# Patient Record
Sex: FEMALE | Race: OTHER | NOT HISPANIC OR LATINO | URBAN - METROPOLITAN AREA
[De-identification: names, ages, dates, MRNs, and addresses within clinical notes are randomized per-mention and may not be internally consistent; named-entity substitution may affect disease eponyms.]

---

## 2020-10-04 ENCOUNTER — EMERGENCY (EMERGENCY)
Facility: HOSPITAL | Age: 35
LOS: 1 days | Discharge: HOME | End: 2020-10-04
Attending: EMERGENCY MEDICINE | Admitting: EMERGENCY MEDICINE
Payer: COMMERCIAL

## 2020-10-04 VITALS
WEIGHT: 110.01 LBS | DIASTOLIC BLOOD PRESSURE: 78 MMHG | SYSTOLIC BLOOD PRESSURE: 115 MMHG | TEMPERATURE: 98 F | HEART RATE: 71 BPM | RESPIRATION RATE: 18 BRPM | OXYGEN SATURATION: 98 %

## 2020-10-04 LAB — SARS-COV-2 RNA SPEC QL NAA+PROBE: SIGNIFICANT CHANGE UP

## 2020-10-04 PROCEDURE — U0003: CPT

## 2020-10-04 PROCEDURE — 99283 EMERGENCY DEPT VISIT LOW MDM: CPT

## 2020-10-04 NOTE — ED ADULT NURSE NOTE - NSIMPLEMENTINTERV_GEN_ALL_ED
Implemented All Universal Safety Interventions:  [place] to call system. Call bell, personal items and telephone within reach. Instruct patient to call for assistance. Room bathroom lighting operational. Non-slip footwear when patient is off stretcher. Physically safe environment: no spills, clutter or unnecessary equipment. Stretcher in lowest position, wheels locked, appropriate side rails in place.

## 2020-10-04 NOTE — ED PROVIDER NOTE - PATIENT PORTAL LINK FT
You can access the FollowMyHealth Patient Portal offered by [facility] by registering at the following website: [email or web address]. By joining [employer]’s FollowMyHealth portal, you will also be able to view your health information using other applications (apps) compatible with our system.

## 2020-10-04 NOTE — ED PROVIDER NOTE - OBJECTIVE STATEMENT
36 y/o female with no sig PMHx here for covid testing. pt states she was in the ER with her son so she decided to get tested. no fever or chills. no cough or congestion. no loss or taste or smell. no n/v/d. no known contacts. no further complaints.

## 2020-10-04 NOTE — ED PROVIDER NOTE - NSFOLLOWUPINSTRUCTIONS_ED_ALL_ED_FT
Follow up with your physician         COVID-19: Slow the Coronavirus Spread    WHAT YOU NEED TO KNOW:    The virus that causes coronavirus disease 2019 (COVID-19) is highly contagious (easily spread). COVID-19 can cause severe or life-threatening health problems in some people. Signs and symptoms of infection can take up to 14 days to begin, or may not develop at all. This means you or someone around you may have the virus and pass it to others without knowing it. No vaccine is available yet for the new coronavirus. You can help slow the spread of the virus by following worldwide and local guidelines for infection prevention.    Limit the Spread of Infectious Disease         DISCHARGE INSTRUCTIONS:    Call your doctor if:   •You have questions about social distancing or ways to keep your home and family safe.      •You have questions or concerns about the new coronavirus or COVID-19.      How the 2019 coronavirus spreads: The virus spreads quickly and easily. You can become infected if you are in contact with a large amount of the virus, even for a short time. You can also become infected by being around a small amount of virus for a long time. The following are ways the virus is thought to spread, but more information may be coming:   •Droplets are the most common way all coronaviruses spread. The virus can travel in droplets that form when a person talks, coughs, or sneezes. Anyone who breathes in the droplets or gets them in his or her eyes can become infected with the virus. Close personal contact with an infected person is thought to be the main way the virus spreads. Close personal contact means you are within 6 feet (2 meters) of the person.      •Person-to-person contact can spread the virus. For example, a person with the virus on his or her hands can spread it by shaking hands with someone. At this time, it does not appear that the virus can be passed to a baby during pregnancy or delivery. The baby can be infected after he or she is born through person-to-person contact. The virus also does not appear to spread in breast milk. If you are pregnant or breastfeeding, talk to your healthcare provider or obstetrician about any concerns you have.      •The virus can stay on objects and surfaces. A person can get the virus on his or her hands by touching the object or surface. Infection happens if the person then touches his or her eyes or mouth with unwashed hands. It is not yet known how long the virus can stay on an object or surface. That is why it is important to clean all objects and surfaces that are used regularly.      •An infected animal may be able to infect a person who touches it. This may happen at live markets or on a farm.      How to wash your hands to help prevent the virus from spreading: Use soap and water. Rub your soapy hands together, lacing your fingers. Wash the front and back of each hand, and in between your fingers. Use the fingers of one hand to scrub under the fingernails of the other hand. Wash for at least 20 seconds. Rinse with warm, running water for several seconds. Then dry your hands with a clean towel or paper towel. Do not touch your eyes, nose, or mouth without washing your hands first. Wash your hands often throughout the day. Use hand  that contains alcohol if soap and water are not available. Teach children how to wash their hands and use hand .    Handwashing         How to cover sneezes and coughs to help prevent the virus from spreading: Turn your face away and cover your mouth and nose with a tissue. Throw the tissue away. Use the bend of your arm if a tissue is not available. Then wash your hands well with soap and water or use hand . Turn your head and cover your face when you are around someone who is sneezing or coughing. Teach children how to cover a cough or sneeze. Remind them to wash their hands.    How social distancing will help prevent the virus from spreading: The best way to prevent infection is to avoid anyone who is infected, but this can be hard to do. An infected person can spread the virus before signs or symptoms begin, or even if signs or symptoms never develop. Social distancing means people avoid close personal contact so the virus cannot spread from one person to another. National and local social distancing rules vary. Rules may change over time as restrictions are lifted. Restrictions may return if an outbreak happens where you live. It is important to know and follow all current social distancing rules in your area. The following are general guidelines:   •Limit trips out of your home. You may be able to have food, medicines, and other supplies delivered. If possible, have delivered items left at your door or other area. Try not to have someone hand you an item. You will be so close to the person that the virus can spread between you.      •Do not have close physical contact with anyone who does not live in your home. Do not shake hands with, hug, or kiss a person as a greeting. Stand or walk as far from others as possible, especially around anyone who is sneezing or coughing. If you must use public transportation (such as a bus or subway), try to sit or stand away from others. You can stay safely connected with others through phone calls, e-mail messages, social media websites, and video chats. Check in on anyone who may be having a hard time socially distancing, or who lives alone. Ask administrators at nursing homes or long-term care facilities how you can safely communicate with someone living there.      •Wear a cloth face covering (mask) when you must go out. A face covering helps prevent the virus from spreading in droplets. Make a covering out of a thick cloth to save medical masks for healthcare workers and first responders. Choose a fabric that holds its shape after it is washed and dried, such as cotton. Check that you can breathe through the fabric when it is folded into several layers. Put the top half of a paper coffee filter in the middle of the fabric to create an extra filter for you. Fold the fabric into a long band. Put each end through a rubber band or hair tie to create ear loops. Fold the ends of the fabric toward the middle. Hold the covering to your face. Adjust it so it covers your nose and mouth completely. Hook the loops onto your ears to keep the covering in place. The following are important points to remember:?Do not use a plastic face shield instead of a cloth covering. A face shield will not protect others from droplets. If a face shield must be used, choose one that wraps around both sides of the face and goes below the chin. Do not use disposable shields more than 1 time. Shields that can be used again need to be cleaned and disinfected between uses. Do not put a face shield or a cloth covering on a  or infant. These increase the risk for sudden infant death syndrome (SIDS).      ?Continue social distancing while you are out. A face covering does not mean you can have close physical contact with others. You still need to stay at least 6 feet (2 meters) away from others.      ?Do not touch your face covering or eyes while you are wearing the covering. Your eyes will not be covered by the cloth. You can be infected if the virus is on your hand and you touch your eyes. Wash your hands with soap and water for 20 seconds or use hand  before you remove your face covering.      ?Do not remove your face covering to talk, sneeze, or cough.      ?Wash face coverings often. Wash them in the warmest water the fabric allows. Make sure the fabric is completely dry before you use the face covering again. Only wear clean coverings when you go out. Use a new coffee filter each time.      ?Certain individuals should not use face coverings. These include children younger than 2 years and anyone who has breathing problems or cannot remove the covering.      ?You can use a clear face covering if someone needs to read your lips. A clear covering is made of cloth but has plastic over the mouth area. This will help the person see your lips more easily. Do not use a plastic face shield instead.      ?Do not use face coverings that have breathing valves or vents. Valves or vents on the sides are designed to allow breathed air to go out. This makes breathing easier, but the virus can travel out of the valve or vent and be spread to others.      •Only allow medical or other necessary professionals into your home. Wear your face covering, and remind professionals to wear a face covering. Remind them to wash their hands when they arrive and before they leave. Do not let in anyone who does not live in your home, even if the person is not sick. A person can pass the virus to others before symptoms of COVID-19 begin. Some people never even develop symptoms. Children commonly have mild symptoms or no symptoms. It may be hard to tell a child not to hug or kiss you. Explain that this is how he or she can help you stay healthy.      •Do not go to someone else's home unless it is necessary. Do not go over to visit, even if the person is lonely. Only go if you need to help him or her.      •Avoid large gatherings and crowds. Gatherings or crowds of 10 or more individuals can cause the virus to spread. Examples of gatherings include parties, sporting events, [employer] services, and conferences. Crowds may form at beaches, whalen, and tourist attractions. Protect yourself by staying away from large gatherings and crowds.      •Ask your healthcare provider for other ways to have appointments. Some providers offer phone, video, or other types of appointments. You may also be able to get prescriptions for a few months of your medicines at a time.      •Stay safe if you must go out to work. You may have a job that can only be done outside your home. Keep physical distance between you and other workers as much as possible. Follow your employer's rules so everyone stays safe.      Prevent an infection in your home:   •Wash your hands often. Make it a habit to wash your hands throughout the day. Wash before and after you care for anyone who thinks or knows he or she has COVID-19. Use soap and water. Remember to wash for at least 20 seconds. You can use hand  that contains alcohol if soap and water are not available.      •Clean and disinfect your home often. Do this even if you think no one living in or coming to your home is infected with the virus. A person can spread the virus before symptoms begin, or even if no symptoms develop. Clean and disinfect to kill and remove the virus from high-touch surfaces and items. This prevents anyone from getting the virus on his or her hands and becoming infected or spreading the virus to others. The following are general guidelines:?Wear disposable gloves while you are cleaning. Do not touch surfaces or items with your bare hands.      ?Use disinfecting wipes or a disinfecting solution. You can make a solution by diluting 4 teaspoons of bleach in 1 quart (4 cups) of water. Clean with a sponge or cloth that can be thrown away or washed in hot, soapy water and reused.      ?Be careful with . Open windows or have circulating air as you clean. Do not mix ammonia with bleach. This will create toxic fumes. Read the labels of all products you use.      ?Clean and disinfect surfaces throughout your home. Surfaces include countertops, cupboard doors, desks, handrails, doorknobs, toilet handles, faucets, chairs, and light switches.      ?Clean and disinfect items well. Objects include keys, phones, computer keyboards and mice, video games, remote controls, and children's toys.      ?Clean used dishes and utensils well. Use hot, soapy water or a .      ?Wash clothing and bedding well. You can use regular laundry detergent. Follow instructions on the clothing or bedding label. Wash and dry on the warmest settings for the fabric.      •Do not share items with anyone. This includes food, drinks, dishes, and eating utensils.      •Prepare surfaces any time you are going to bring something into your home. Find space you can use to place items you bring in. Find space you can clean and disinfect well, such as a kitchen countertop.      •Safely handle packages delivered to your home. It is not known for sure how long the virus can stay on cardboard or other packaging. You may want to wipe packages with sanitizing wipes. Open the package. Wash your hands. Then move the contents of the package onto a clean surface. Throw the packaging away outside your home. Then wash your hands for at least 20 seconds with soap and water. Clean the surface you laid the package on when you brought it in. Remember to clean and disinfect anything else you touched, such as doorknobs or faucet handles.      •Safely handle food you have delivered or  from a restaurant. If possible, have food left at your door or placed into your car. This helps prevent close physical contact with anyone. Open the delivery containers and put the contents into clean dishes or containers. Throw the delivery containers away outside your home. Wash your hands.      •Keep anyone who is infected away from others in the home. A person who has COVID-19 needs to stay at home until healthcare providers say it is okay to be around others. This is important, but it can also lead to others in the home becoming infected. If possible, keep the infected person isolated (away from others in the home). The person should have his or her own dishes and eating utensils. Items the person uses need to be cleaned separately. Anyone who touches the person's items, clothing, or bedding needs to wear gloves that can be thrown away after use. It is important for the person to feel connected with others. Isolation can be lonely. The person may feel anxious or depressed. He or she can safely stay connected through phone calls, video chats, social media, and e-mail messages.      Stay safe if you must go out of your home: It is not known for sure how long the virus can live on objects and surfaces. Until more is known, follow these and any local recommendations to prevent infection:   •Before you go out: ?Remember to wash your hands. Wash before you leave your home, so you do not bring the virus with you. Wash again when you get home, after you put away any items you bought.      ?Bring disinfecting wipes or hand  with you. Hold a wipe or tissue as you open any doors. Use hand  after you touch elevator buttons or other commonly used surfaces or items. Apply hand  or use a wipe for your hands before you use the keys to unlock or start the car.      ?Make a list of items to buy before you go out. This will limit the items you touch in the store. The virus may live on surfaces and objects for up to several days. The more items you handle, the more risk you take of getting the virus on your hands.      ?Prepare surfaces for when you return. Find space you can use to place items you bought. Find space you can clean and disinfect well, such as a kitchen countertop.      •While you are out: ?Wear your cloth face covering. Do not touch the covering or your eyes while you are out.      ?Use disinfecting wipes to clean items you need to use to shop. Clean shopping cart handles. Clean the area a toddler will sit on or where you will put a baby carrier. Wipe a cart made for children to drive in the store before your toddler gets into it. Wipe the seat, steering wheel, and any part your child must touch.      ?If possible, use a debit or credit card to pay. The virus may be able to stay on paper money. You can become infected when you touch the money.      •When you get home: ?Carefully take the face covering off and wash your hands. Put used coverings together. If possible, keep them in a closed laundry bag or basket until you can wash them.      ?Unpack your items safely. Wipe or wash your hands before you open packaging. Put your items on the clean surface you prepared. You can use a disinfecting wipe to clean items before you put them away. Wash fruits and vegetables before you put them away. If possible, scrub the skins with a vegetable brush.      ?Clean countertops or other surfaces any shopping bags touched. When you are finished putting your items away, wash your hands. Use disinfecting wipes or a solution to clean surfaces. You can also wipe the vehicle you drove. Wipe the area the bags had been. Wipe anything you touched inside the car. Examples include the steering wheel, seatbelt, inner and outer door handles, turn signals, and radio or other buttons. After items are put away and areas cleaned, remember to wash your hands.        For more information:   •Centers for Disease Control and Prevention  [address] [place] [address]  Phone: 2-236-9811675  Phone: 2-256-2286650  Web Address: http://www.cdc.gov           © Copyright [employer] 2020           back to top                          © Copyright [employer] 2020

## 2020-10-08 DIAGNOSIS — Z20.828 CONTACT WITH AND (SUSPECTED) EXPOSURE TO OTHER VIRAL COMMUNICABLE DISEASES: ICD-10-CM

## 2021-11-07 ENCOUNTER — TRANSCRIPTION ENCOUNTER (OUTPATIENT)
Age: 36
End: 2021-11-07

## 2022-05-13 ENCOUNTER — NON-APPOINTMENT (OUTPATIENT)
Age: 37
End: 2022-05-13

## 2022-05-15 ENCOUNTER — NON-APPOINTMENT (OUTPATIENT)
Age: 37
End: 2022-05-15

## 2022-05-17 ENCOUNTER — TRANSCRIPTION ENCOUNTER (OUTPATIENT)
Age: 37
End: 2022-05-17

## 2022-05-17 PROBLEM — Z78.9 OTHER SPECIFIED HEALTH STATUS: Chronic | Status: ACTIVE | Noted: 2020-10-04

## 2022-05-18 ENCOUNTER — APPOINTMENT (OUTPATIENT)
Age: 37
End: 2022-05-18

## 2022-05-19 ENCOUNTER — NON-APPOINTMENT (OUTPATIENT)
Age: 37
End: 2022-05-19

## 2023-03-16 NOTE — ED ADULT NURSE NOTE - NSSEPSISSUSPECTED_ED_A_ED
No Quality 110: Preventive Care And Screening: Influenza Immunization: Influenza Immunization previously received during influenza season Detail Level: Detailed